# Patient Record
(demographics unavailable — no encounter records)

---

## 2024-12-18 NOTE — HISTORY OF PRESENT ILLNESS
[FreeTextEntry1] : CC: Osteoporosis This is a 47-year-old female with osteopenia, history of obesity, migraines, hyperlipidemia, asthma, history of vitamin D insufficiency, history of elevated TSH, scoliosis, hip fracture, anxiety, depression, GERD, insomnia, tachycardia, here for follow-up.  She had a right hip fracture in  after a fall. She underwent ORIF. She was premenopausal at that time. She took Forteo for 2 years, last dose was in .  She reports she also had a right leg fracture (unsure if tibia or fibula) and left heel bone fracture. She did not require surgery for these fractures. Z score and T score were both low on bone density scan. Reclast was not covered by insurance.  She is on calcium 500 mg daily.  She is on vitamin D3 4000 IU daily.  Menarche was at the age of 12. Menopause was at the age of 42. Her mother has a history of early menopause. Reports she did not receive HRT after menopause as she has a history of migraines with aura.   No history of steroid use, tobacco use, or alcohol use. 24-hour urine cortisol normal. DEXA from 2024 showed L1-L4 T-score -2.4, left femoral neck T-score -2.4, left total hip T-score -1.7.  Prolia started May 2022. Last Prolia dose was 2024.   She is on Trulicity 3 mg weekly. She was on Ozempic in the past but discontinued it due to nausea and vomiting.

## 2024-12-18 NOTE — ASSESSMENT
[FreeTextEntry1] : This is a 47-year-old female with osteopenia, migraines, obesity, hyperlipidemia, asthma, history of vitamin D insufficiency, history of elevated TSH, scoliosis, hip fracture, anxiety, depression, GERD, insomnia, tachycardia, here for follow-up. 1. Osteopenia She sustained a right hip fracture in 2011 after a fall. She underwent ORIF. She was premenopausal at that time. She took Forteo for 2 years, last dose was in 2015. She reports she also had a right leg fracture (unsure if tibia or fibula) and left heel bone fracture. She is on calcium 500 mg daily. She is on vitamin D3 4000 IU daily. Risk factors for osteoporosis include  female with early menopause, omeprazole use. No history of steroid use, tobacco use, or alcohol use. DEXA from September 2024 showed L1-L4 T-score -2.4, left femoral neck T-score -2.4, left total hip T-score -1.7.  Received first Prolia dose on May 5, 2022, without incident. Prolia started May 2022. Last Prolia dose was 11/13/2024.  Check CMP and 25 vitamin D.  Treatment options for osteopenia reviewed. Next Prolia dose is due in May 2025. Reviewed with patient, will consider starting alendronate 70 mg daily in May 2025 and discontinuing Prolia. She reports her GERD is under control and has not used photonics in a long time. Side effects reviewed including GI effects, atypical femur fractures, and ONJ reviewed. Check DEXA.  2. History of obesity/overweight Continue Trulicity 3 mg weekly. She was on Ozempic in the past but discontinued it due to nausea and vomiting.  Avoid increasing Trulicity further as BMI is 23.

## 2024-12-18 NOTE — PHYSICAL EXAM
[Alert] : alert [Well Nourished] : well nourished [Healthy Appearance] : healthy appearance [No Acute Distress] : no acute distress [Well Developed] : well developed [Normal Sclera/Conjunctiva] : normal sclera/conjunctiva [No Proptosis] : no proptosis [No Respiratory Distress] : no respiratory distress [No Spinal Tenderness] : no spinal tenderness [Normal Affect] : the affect was normal [Normal Insight/Judgement] : insight and judgment were intact [Normal Mood] : the mood was normal

## 2025-01-17 NOTE — ASSESSMENT
[FreeTextEntry1] : 46 yo female with recurrent uti currently with uti not improving with keflex. Will start on augmentin and fu urine culture to treat based on sensitivities. REgarding Shauna discussed vaginal estrogen cream but she does not want to apply. We will treat this uti first and fu to discuss treament with hipprex  Plan Reviewed urinalysis from urgent care indicating uti Urinalysis Urine culture Augmentin bid for 7 days - prescription sent FU to discuss longterm treatment of Shauna with hipprex   Patient is being seen today for evaluation and management of a chronic and longitudinal ongoing condition and I am of the primary treating physician.

## 2025-01-17 NOTE — HISTORY OF PRESENT ILLNESS
[FreeTextEntry1] : 46 yo female with UTI. States she was given keflex recently without improvement in symptoms. Her urinalysis was indicative of infection but no culture. She typically has 2-3 UTIs per year which started post-menopause.

## 2025-01-27 NOTE — HISTORY OF PRESENT ILLNESS
[FreeTextEntry1] : 48 yo female with UTI. States she was given keflex recently without improvement in symptoms. Her urinalysis was indicative of infection but no culture. She typically has 2-3 UTIs per year which started post-menopause.   She went to ED for lower back pain and CT demonstrated significant stool burden.   States she was started on cefpodoxime and has improvement but continued dysuria.

## 2025-02-10 NOTE — HISTORY OF PRESENT ILLNESS
[de-identified] : Ms. JLUIS GUILLAUME is a 47 year old female with Hx of anxiety, depression, autism spectrum, asthma, migraines, MVP, osteoporosis, left shoulder rotator cuff repair 1/3/22, migraines, and HLD, who presents for a post ER follow up visit. She is accompanied by her mother  Pt was seen in the ER Jan 26 for UTI. Had CT pelvis and abdomen done, was started on Abx. Followed up with urology, was started on Methenamine BID. Pt reports she feel better.  Pt c/o constipation. Has chronic constipation. Last bowel movement was on Tuesday. Took Amitiza and Linzess Tuesday with some relief, had a bowel movement.  C/o b/l ear pain. Denies any SOB, CP, abdominal pain, N/V/D, dizziness, or leg swelling. Reports compliance with taking her meds daily.

## 2025-02-10 NOTE — ADDENDUM
[FreeTextEntry1] : Documented by Jaci Washington acting as a scribe for Dr. Eun Farmer. 02/10/2025   All medical record entries made by the scribe were at my, Dr. Eun Farmer, direction and personally dictated by me on 02/10/2025. I have reviewed the chart and agree that the record accurately reflects my personal performance of the history, physical exam, assessment and plan. I have also personally directed, reviewed, and agreed with the chart.

## 2025-02-10 NOTE — REVIEW OF SYSTEMS
[Earache] : earache [Constipation] : constipation [Negative] : Heme/Lymph [de-identified] : Hx of anxiety, depression, autism spectrum, insomnia

## 2025-02-10 NOTE — ASSESSMENT
[FreeTextEntry1] : Follow up  s/p ER visit for UTI hospital d/c papers & imaging results reviewed followed up with urology- was started on Methenamine - cont Methenamine  constipation cont Linzess and Amitiza start daily probiotics increase po fluids  b/l cerumen impaction referred to ENT  History of Migraine headaches Receiving Botox injections every 3 months  History of Osteoporosis Following with Endocrine on Prolia injections every 6 months  hx of obesity cont Trulicity for weight loss  History of Anxiety/depression/ASD, Insomnia, autism spectrum Following with psychiatry monthly cont Wellbutrin 150mg cont Rexulti 2mg daily  Hx of Asthma cont inhalers PRN  HLD cont Crestor 10 mg daily Reinforced the importance of following a low fat/low cholesterol diet

## 2025-02-10 NOTE — PHYSICAL EXAM
[No Acute Distress] : no acute distress [Well Nourished] : well nourished [Well Developed] : well developed [Well-Appearing] : well-appearing [Normal Sclera/Conjunctiva] : normal sclera/conjunctiva [Normal Outer Ear/Nose] : the outer ears and nose were normal in appearance [No JVD] : no jugular venous distention [No Lymphadenopathy] : no lymphadenopathy [Supple] : supple [No Respiratory Distress] : no respiratory distress  [No Accessory Muscle Use] : no accessory muscle use [Clear to Auscultation] : lungs were clear to auscultation bilaterally [Normal Rate] : normal rate  [Regular Rhythm] : with a regular rhythm [Normal S1, S2] : normal S1 and S2 [No Murmur] : no murmur heard [Pedal Pulses Present] : the pedal pulses are present [No Edema] : there was no peripheral edema [No Extremity Clubbing/Cyanosis] : no extremity clubbing/cyanosis [Soft] : abdomen soft [Non Tender] : non-tender [Non-distended] : non-distended [No Masses] : no abdominal mass palpated [Normal Posterior Cervical Nodes] : no posterior cervical lymphadenopathy [Normal Anterior Cervical Nodes] : no anterior cervical lymphadenopathy [No CVA Tenderness] : no CVA  tenderness [No Spinal Tenderness] : no spinal tenderness [No Joint Swelling] : no joint swelling [Grossly Normal Strength/Tone] : grossly normal strength/tone [No Rash] : no rash [Coordination Grossly Intact] : coordination grossly intact [No Focal Deficits] : no focal deficits [Normal Gait] : normal gait [Normal Affect] : the affect was normal [Normal Insight/Judgement] : insight and judgment were intact [de-identified] : + b/l ear cerumen impaction

## 2025-03-17 NOTE — PHYSICAL EXAM
[2+] : left foot dorsalis pedis 2+ [Sensation] : the sensory exam was normal to light touch and pinprick [No Focal Deficits] : no focal deficits [Deep Tendon Reflexes (DTR)] : deep tendon reflexes were 2+ and symmetric [Motor Exam] : the motor exam was normal [Ankle Swelling (On Exam)] : not present [Varicose Veins Of Lower Extremities] : not present [] : not present [Delayed in the Right Toes] : capillary refills normal in right toes [Delayed in the Left Toes] : capillary refills normal in the left toes [FreeTextEntry3] : Hair growth noted on digits. Proximal to distal cooling is within normal limits.  [de-identified] : She has a painful solitary wart at the right sub 5 area that is painful with direct and side to side palpation. She has functional hallux limitus but no real other symptomatology.  [FreeTextEntry1] : She has severely deformed and thickened mycotic nails 1 and 5 on the left and 2 and 5 on the right. They are yellow, chalky, brittle, with subungual debris. Pain at the tops and tips of those toes.  Regarding the right foot, there is a sub 5 wart. It is about 8mm. There is cauliflowered appearance, craterized, loss of skin lines. It is a painful solitary plantar wart in the right sub 5 area. [Diminished Throughout Right Foot] : normal sensation with monofilament testing throughout right foot [Diminished Throughout Left Foot] : normal sensation with monofilament testing throughout left foot

## 2025-03-17 NOTE — HISTORY OF PRESENT ILLNESS
[FreeTextEntry1] : Patient presents today for evaluation of multiple issues. She has her aide with her. She has anxiety and depression and takes medication for blood pressure and cholesterol.  She has this new lesion at the bottom of her right foot. It has been present for months and getting bigger and painful. She also has very deformed mycotic nails that she cannot care for herself. They cause her a lot of pain and grief.

## 2025-03-17 NOTE — PHYSICAL EXAM
[2+] : left foot dorsalis pedis 2+ [Sensation] : the sensory exam was normal to light touch and pinprick [No Focal Deficits] : no focal deficits [Deep Tendon Reflexes (DTR)] : deep tendon reflexes were 2+ and symmetric [Motor Exam] : the motor exam was normal [Ankle Swelling (On Exam)] : not present [Varicose Veins Of Lower Extremities] : not present [] : not present [Delayed in the Right Toes] : capillary refills normal in right toes [Delayed in the Left Toes] : capillary refills normal in the left toes [FreeTextEntry3] : Hair growth noted on digits. Proximal to distal cooling is within normal limits.  [de-identified] : She has a painful solitary wart at the right sub 5 area that is painful with direct and side to side palpation. She has functional hallux limitus but no real other symptomatology.  [FreeTextEntry1] : She has severely deformed and thickened mycotic nails 1 and 5 on the left and 2 and 5 on the right. They are yellow, chalky, brittle, with subungual debris. Pain at the tops and tips of those toes.  Regarding the right foot, there is a sub 5 wart. It is about 8mm. There is cauliflowered appearance, craterized, loss of skin lines. It is a painful solitary plantar wart in the right sub 5 area. [Diminished Throughout Right Foot] : normal sensation with monofilament testing throughout right foot [Diminished Throughout Left Foot] : normal sensation with monofilament testing throughout left foot

## 2025-03-17 NOTE — PHYSICAL EXAM
[2+] : left foot dorsalis pedis 2+ [No Focal Deficits] : no focal deficits [Sensation] : the sensory exam was normal to light touch and pinprick [Deep Tendon Reflexes (DTR)] : deep tendon reflexes were 2+ and symmetric [Motor Exam] : the motor exam was normal [Ankle Swelling (On Exam)] : not present [Varicose Veins Of Lower Extremities] : not present [] : not present [Delayed in the Right Toes] : capillary refills normal in right toes [Delayed in the Left Toes] : capillary refills normal in the left toes [FreeTextEntry3] : Hair growth noted on digits. Proximal to distal cooling is within normal limits.  [de-identified] : She has a painful solitary wart at the right sub 5 area that is painful with direct and side to side palpation. She has functional hallux limitus but no real other symptomatology.  [FreeTextEntry1] : She has severely deformed and thickened mycotic nails 1 and 5 on the left and 2 and 5 on the right. They are yellow, chalky, brittle, with subungual debris. Pain at the tops and tips of those toes.  Regarding the right foot, there is a sub 5 wart. It is about 8mm. There is cauliflowered appearance, craterized, loss of skin lines. It is a painful solitary plantar wart in the right sub 5 area. [Diminished Throughout Right Foot] : normal sensation with monofilament testing throughout right foot [Diminished Throughout Left Foot] : normal sensation with monofilament testing throughout left foot

## 2025-03-17 NOTE — ASSESSMENT
[FreeTextEntry1] : Impression: Onychomycosis. Pain toes, bilateral. Wart right sub 5.  Treatment: Regarding the wart, the patient consented. A time out was performed to the identified area of maximal tenderness. I prepped the area with alcohol.  A debridement tray was opened. I used a #15 blade and tissue nipper and debrided the wart, attempted full thickness and touched it with silver nitrate and then TCA/chemocautery. An aperture pad was applied. I expect this should eradicate this wart and she will follow-up in the office for evaluation only as needed regarding this issue. I manually and mechanically debrided mycotic nails x4 using a small straight nail splitter and rotary lawson. The nails were aggressively debrided and debulked and dystrophic nails were debrided and trimmed to hygienic length without incident in order to minimize her symptomatology and make her comfortable in shoe gear.  I ePrescribed Ciclopirox for her to use for the next 2 months. I discussed its usage. Her shoes are orthopedic and adequate with good memory foam cushioning. She will follow-up in the office for evaluation as needed.

## 2025-03-17 NOTE — PHYSICAL EXAM
[2+] : left foot dorsalis pedis 2+ [Sensation] : the sensory exam was normal to light touch and pinprick [No Focal Deficits] : no focal deficits [Deep Tendon Reflexes (DTR)] : deep tendon reflexes were 2+ and symmetric [Motor Exam] : the motor exam was normal [Ankle Swelling (On Exam)] : not present [Varicose Veins Of Lower Extremities] : not present [] : not present [Delayed in the Right Toes] : capillary refills normal in right toes [Delayed in the Left Toes] : capillary refills normal in the left toes [FreeTextEntry3] : Hair growth noted on digits. Proximal to distal cooling is within normal limits.  [de-identified] : She has a painful solitary wart at the right sub 5 area that is painful with direct and side to side palpation. She has functional hallux limitus but no real other symptomatology.  [FreeTextEntry1] : She has severely deformed and thickened mycotic nails 1 and 5 on the left and 2 and 5 on the right. They are yellow, chalky, brittle, with subungual debris. Pain at the tops and tips of those toes.  Regarding the right foot, there is a sub 5 wart. It is about 8mm. There is cauliflowered appearance, craterized, loss of skin lines. It is a painful solitary plantar wart in the right sub 5 area. [Diminished Throughout Right Foot] : normal sensation with monofilament testing throughout right foot [Diminished Throughout Left Foot] : normal sensation with monofilament testing throughout left foot

## 2025-05-21 NOTE — REVIEW OF SYSTEMS
[Negative] : Heme/Lymph [As Noted in HPI] : as noted in HPI [Abdominal Pain] : abdominal pain [Bloating (gassiness)] : bloating [FreeTextEntry7] : Vomiting

## 2025-05-21 NOTE — HISTORY OF PRESENT ILLNESS
[FreeTextEntry1] : 4/2023: Internal hemorrhoids, one 8 mm polyp. Repeat 5 years.  [de-identified] : 5/16/25: mild biliary dilatation, no acute abnormality.

## 2025-05-21 NOTE — REASON FOR VISIT
[Follow-up] : a follow-up of an existing diagnosis [Family Member] : family member [FreeTextEntry1] : S/P ER visit 5/16/25 for RLQ abdominal pain and nausea

## 2025-05-21 NOTE — ASSESSMENT
[FreeTextEntry1] : She is a 46-year-old female with irritable bowel syndrome who was started on Ozempic in June of last year and has lost 46 pounds.  She has a recent onset of vomiting.  She denies abdominal pain or heartburn.  She is presently taking pantoprazole 40 mg daily and dicyclomine 20 mg 3 times a day.  She had both a colonoscopy and upper endoscopy last year which were normal, however her colon had 1 polyp which was removed.  She has no new medical problems nor been on any new medications except for Ozempic  RTO 5/21/25 s/p ER visit 5/16/25 for RLQ abdominal pain and nausea. Family noted at bedside. Patient reports increased ibuprofen use over the past month for dental work. RLQ abdominal pain continues with nausea and dark stools. Symptoms started approximately 3 weeks ago. Right Medi port in place for migraine infusion treatments. Currently on trulicity. Reviewed ER lab work and imaging, labs wnl and CT scan performed 5/16/25 resulted with mildly biliary dilatation. S/P cholecystectomy.  Discussed results of last EGD/colonoscopy performed 4/2023. Denies sob, cp or rectal bleeding. Patient is concerned about possible gastric ulcer. Will order MRCP to assess biliary dilatation and schedule EGD. Will send rx pantoprazole 40 mg daily qAM and order repeat lab work. Will follow up results with NP.   Plan: 1. Ordered lab work 2. Ordered MRCP 3. Rx sent for pantoprazole 40 mg daily qAM 4. Schedule EGD 5. Follow up results with NP   Patient was advised to undergo endoscopy to which they agreed. The procedure will be performed in [facility] with the assistance of an anesthesiologist. They were given a booklet distributed by the [employer] explaining the procedure in detail and they understood the risks of the procedure not limited to infection, bleeding, perforation or non- diagnosis of gastric or esophageal cancer. Patient was advised that they could not drive home, if they choose to receive sedation.   Further diagnostic and treatment recommendations will be based upon the procedure and any biopsies, if they are taken.   Thank you for allowing me to participate in this patient's health care.

## 2025-05-21 NOTE — PHYSICAL EXAM
[Normal] : normal bowel sounds, non-tender, no masses, soft, no no hepato-splenomegaly [Alert] : alert [Normal Voice/Communication] : normal voice/communication [Healthy Appearing] : healthy appearing [No Acute Distress] : no acute distress [Sclera] : the sclera and conjunctiva were normal [Hearing Threshold Finger Rub Not [place]aines] : hearing was normal [Normal Lips/Gums] : the lips and gums were normal [Oropharynx] : the oropharynx was normal [Normal Appearance] : the appearance of the neck was normal [No Neck Mass] : no neck mass was observed [No Respiratory Distress] : no respiratory distress [No Acc Muscle Use] : no accessory muscle use [Respiration, Rhythm And Depth] : normal respiratory rhythm and effort [Auscultation Breath Sounds / Voice Sounds] : lungs were clear to auscultation bilaterally [Heart Rate And Rhythm] : heart rate was normal and rhythm regular [Normal S1, S2] : normal S1 and S2 [Murmurs] : no murmurs [Bowel Sounds] : normal bowel sounds [Abdomen Tenderness] : non-tender [No Masses] : no abdominal mass palpated [Abdomen Soft] : soft [] : no hepatosplenomegaly [Oriented To Time, Place, And Person] : oriented to person, place, and time

## 2025-06-01 NOTE — ADDENDUM
[FreeTextEntry1] : Documented by Alban Mireles acting as a scribe for Dr. Eun Farmer. 05/28/2025  All medical records entries made by the scribe were at my, Dr. Farmer, direction and personally dictated by me on 05/28/2025. I have reviewed the chart and agree that the record accurately reflects my personal performance of the history, physical exam, assessment and plan. I have also personally directed, reviewed, and agreed with the chart.

## 2025-06-01 NOTE — HISTORY OF PRESENT ILLNESS
[Home] : at home, [unfilled] , at the time of the visit. [Medical Office: ([facility])___] : at the medical office located in  [Telehealth (audio & video)] : This visit was provided via telehealth using real-time 2-way audio visual technology. [Verbal consent obtained from patient] : the patient, [unfilled] [de-identified] : Ms. JLUIS GUILLAUME is a 47 year old female with hx of anxiety, depression, autism spectrum, asthma, migraines, MVP, osteoporosis, left shoulder rotator cuff repair 1/3/22, migraines, and HLD, seen in telemedicine for a post-hospitalization follow up visit.   Pt was seen in the ER for abdominal pain. All tests were normal. Pt has since seen GI and will be having MRCP tomorrow and an endoscopy on Friday. Denies any SOB, CP, abdominal pain, N/V/D, headache, dizziness, or leg swelling.   Reports compliance with taking her meds daily.

## 2025-06-01 NOTE — ASSESSMENT
[FreeTextEntry1] : Follow up  s/p ER visit for abdominal pain hospital records & imaging results reviewed - CT abdomen no acute pathology followed up with GI pt is scheduled for MRCP for 5/29 and endoscopy on 5/30  History of Migraine headaches Receiving Botox injections every 3 months  History of Osteoporosis Following with Endocrine on Prolia injections every 6 months  hx of obesity cont Trulicity for weight loss  History of Anxiety/depression/ASD, Insomnia, autism spectrum Following with psychiatry monthly cont Wellbutrin 150mg cont Rexulti 2mg daily  Hx of Asthma cont inhalers PRN  HLD cont Crestor 10 mg daily Reinforced the importance of following a low fat/low cholesterol diet

## 2025-06-01 NOTE — HISTORY OF PRESENT ILLNESS
[Home] : at home, [unfilled] , at the time of the visit. [Medical Office: ([facility])___] : at the medical office located in  [Telehealth (audio & video)] : This visit was provided via telehealth using real-time 2-way audio visual technology. [Verbal consent obtained from patient] : the patient, [unfilled] [de-identified] : Ms. JLUIS GUILLAUME is a 47 year old female with hx of anxiety, depression, autism spectrum, asthma, migraines, MVP, osteoporosis, left shoulder rotator cuff repair 1/3/22, migraines, and HLD, seen in telemedicine for a post-hospitalization follow up visit.   Pt was seen in the ER for abdominal pain. All tests were normal. Pt has since seen GI and will be having MRCP tomorrow and an endoscopy on Friday. Denies any SOB, CP, abdominal pain, N/V/D, headache, dizziness, or leg swelling.   Reports compliance with taking her meds daily.

## 2025-07-08 NOTE — ASSESSMENT
[FreeTextEntry1] : Follow up  joint pains we'll check arthralgia panel today referred to rheumatology  Constipation.   following with GI She is s/p EGD 6/6/25 On amatiza BID and miralax.   History of Migraine headaches Receiving Botox injections every 3 months  History of Osteoporosis Following with Endocrine on Prolia injections every 6 months  hx of obesity cont Trulicity for weight loss  History of Anxiety/depression/ASD, Insomnia, autism spectrum Following with psychiatry monthly cont Wellbutrin 150mg cont Rexulti 2mg daily  Hx of Asthma cont inhalers PRN  HLD cont Crestor 10 mg daily Reinforced the importance of following a low fat/low cholesterol diet  Health maintenance referred to gyn and for mammogram  blood work ordered follow up in one week for lab results

## 2025-07-08 NOTE — REVIEW OF SYSTEMS
[Negative] : Heme/Lymph [FreeTextEntry9] : see hpi [de-identified] : autism spectrum [de-identified] : autism spectrum, Anxiety/depression/ASD, Insomnia

## 2025-07-08 NOTE — PHYSICAL EXAM
[No Acute Distress] : no acute distress [Well Nourished] : well nourished [Well-Appearing] : well-appearing [Normal Sclera/Conjunctiva] : normal sclera/conjunctiva [PERRL] : pupils equal round and reactive to light [EOMI] : extraocular movements intact [Normal Outer Ear/Nose] : the outer ears and nose were normal in appearance [Normal Oropharynx] : the oropharynx was normal [Normal TMs] : both tympanic membranes were normal [No JVD] : no jugular venous distention [No Lymphadenopathy] : no lymphadenopathy [Supple] : supple [No Respiratory Distress] : no respiratory distress  [No Accessory Muscle Use] : no accessory muscle use [Clear to Auscultation] : lungs were clear to auscultation bilaterally [Normal Rate] : normal rate  [Regular Rhythm] : with a regular rhythm [Normal S1, S2] : normal S1 and S2 [No Murmur] : no murmur heard [No Edema] : there was no peripheral edema [Soft] : abdomen soft [Non Tender] : non-tender [Non-distended] : non-distended [No Masses] : no abdominal mass palpated [No HSM] : no HSM [Normal Bowel Sounds] : normal bowel sounds [Normal Posterior Cervical Nodes] : no posterior cervical lymphadenopathy [Normal Anterior Cervical Nodes] : no anterior cervical lymphadenopathy [No CVA Tenderness] : no CVA  tenderness [No Joint Swelling] : no joint swelling [No Rash] : no rash [Coordination Grossly Intact] : coordination grossly intact [No Focal Deficits] : no focal deficits [Normal Gait] : normal gait [Alert and Oriented x3] : oriented to person, place, and time [Normal Insight/Judgement] : insight and judgment were intact [de-identified] : +port on rt chest

## 2025-07-08 NOTE — HISTORY OF PRESENT ILLNESS
[de-identified] : Ms. JLUIS GUILLAUME is a 47 year old female with hx of anxiety, depression, autism spectrum, asthma, migraines, MVP, osteoporosis, left shoulder rotator cuff repair 1/3/22, migraines, and HLD,presents for a follow up visit.   She is following with GI for constipation.  Taking amatiza BID and miralax. She is s/p EGD 6/6/25  She c/o joint pains in b/l knee, rt foot and fingers of b/l hand. Says pain started 2-3 days ago. Took Ibuprofen 800mg but says it did not help Reports compliance with taking her meds daily.